# Patient Record
Sex: MALE | Race: WHITE | NOT HISPANIC OR LATINO | Employment: OTHER | URBAN - NONMETROPOLITAN AREA
[De-identification: names, ages, dates, MRNs, and addresses within clinical notes are randomized per-mention and may not be internally consistent; named-entity substitution may affect disease eponyms.]

---

## 2023-01-01 ENCOUNTER — TELEPHONE (OUTPATIENT)
Dept: CARDIOLOGY | Facility: CLINIC | Age: 88
End: 2023-01-01
Payer: MEDICARE

## 2023-09-28 ENCOUNTER — APPOINTMENT (OUTPATIENT)
Dept: GENERAL RADIOLOGY | Age: 88
End: 2023-09-28
Payer: MEDICARE

## 2023-09-28 ENCOUNTER — HOSPITAL ENCOUNTER (EMERGENCY)
Age: 88
Discharge: HOME | End: 2023-09-28
Attending: EMERGENCY MEDICINE
Payer: MEDICARE

## 2023-09-28 VITALS
BODY MASS INDEX: 34.1 KG/M2 | OXYGEN SATURATION: 97 % | HEIGHT: 68 IN | RESPIRATION RATE: 19 BRPM | TEMPERATURE: 98 F | HEART RATE: 91 BPM | WEIGHT: 225 LBS | SYSTOLIC BLOOD PRESSURE: 137 MMHG | DIASTOLIC BLOOD PRESSURE: 81 MMHG

## 2023-09-28 DIAGNOSIS — R06.00 DYSPNEA, UNSPECIFIED TYPE: Primary | ICD-10-CM

## 2023-09-28 LAB
ALBUMIN SERPL-MCNC: 3.7 G/DL (ref 3.5–4.6)
ALP SERPL-CCNC: 44 U/L (ref 35–104)
ALT SERPL-CCNC: 99 U/L (ref 0–41)
ANION GAP SERPL CALCULATED.3IONS-SCNC: 12 MEQ/L (ref 9–15)
AST SERPL-CCNC: 95 U/L (ref 0–40)
BASOPHILS # BLD: 0.1 K/UL (ref 0–0.2)
BASOPHILS NFR BLD: 0.4 %
BILIRUB SERPL-MCNC: 0.6 MG/DL (ref 0.2–0.7)
BNP BLD-MCNC: 1148 PG/ML
BUN SERPL-MCNC: 23 MG/DL (ref 8–23)
CALCIUM SERPL-MCNC: 8.3 MG/DL (ref 8.5–9.9)
CHLORIDE SERPL-SCNC: 97 MEQ/L (ref 95–107)
CO2 SERPL-SCNC: 26 MEQ/L (ref 20–31)
CREAT SERPL-MCNC: 1.01 MG/DL (ref 0.7–1.2)
EOSINOPHIL # BLD: 0 K/UL (ref 0–0.7)
EOSINOPHIL NFR BLD: 0 %
ERYTHROCYTE [DISTWIDTH] IN BLOOD BY AUTOMATED COUNT: 14.7 % (ref 11.5–14.5)
GLOBULIN SER CALC-MCNC: 1.7 G/DL (ref 2.3–3.5)
GLUCOSE SERPL-MCNC: 122 MG/DL (ref 70–99)
HCT VFR BLD AUTO: 43 % (ref 42–52)
HGB BLD-MCNC: 14.3 G/DL (ref 14–18)
INR PPP: 1.1
LACTATE BLDV-SCNC: 3.6 MMOL/L (ref 0.5–2.2)
LYMPHOCYTES # BLD: 1.2 K/UL (ref 1–4.8)
LYMPHOCYTES NFR BLD: 8.1 %
MCH RBC QN AUTO: 36.2 PG (ref 27–31.3)
MCHC RBC AUTO-ENTMCNC: 33.3 % (ref 33–37)
MCV RBC AUTO: 108.9 FL (ref 79–92.2)
MONOCYTES # BLD: 1.3 K/UL (ref 0.2–0.8)
MONOCYTES NFR BLD: 8.8 %
NEUTROPHILS # BLD: 11.1 K/UL (ref 1.4–6.5)
NEUTS SEG NFR BLD: 78.1 %
PLATELET # BLD AUTO: 171 K/UL (ref 130–400)
POTASSIUM SERPL-SCNC: 4.6 MEQ/L (ref 3.4–4.9)
PROT SERPL-MCNC: 5.4 G/DL (ref 6.3–8)
PROTHROMBIN TIME: 14.5 SEC (ref 12.3–14.9)
RBC # BLD AUTO: 3.95 M/UL (ref 4.7–6.1)
SODIUM SERPL-SCNC: 135 MEQ/L (ref 135–144)
TROPONIN T SERPL-MCNC: <0.01 NG/ML (ref 0–0.01)
WBC # BLD AUTO: 14.3 K/UL (ref 4.8–10.8)

## 2023-09-28 PROCEDURE — 84484 ASSAY OF TROPONIN QUANT: CPT

## 2023-09-28 PROCEDURE — 83880 ASSAY OF NATRIURETIC PEPTIDE: CPT

## 2023-09-28 PROCEDURE — 74022 RADEX COMPL AQT ABD SERIES: CPT

## 2023-09-28 PROCEDURE — 85610 PROTHROMBIN TIME: CPT

## 2023-09-28 PROCEDURE — 93005 ELECTROCARDIOGRAM TRACING: CPT | Performed by: EMERGENCY MEDICINE

## 2023-09-28 PROCEDURE — 99285 EMERGENCY DEPT VISIT HI MDM: CPT

## 2023-09-28 PROCEDURE — 83605 ASSAY OF LACTIC ACID: CPT

## 2023-09-28 PROCEDURE — 2580000003 HC RX 258: Performed by: EMERGENCY MEDICINE

## 2023-09-28 PROCEDURE — 85025 COMPLETE CBC W/AUTO DIFF WBC: CPT

## 2023-09-28 PROCEDURE — 36415 COLL VENOUS BLD VENIPUNCTURE: CPT

## 2023-09-28 PROCEDURE — 80053 COMPREHEN METABOLIC PANEL: CPT

## 2023-09-28 RX ORDER — 0.9 % SODIUM CHLORIDE 0.9 %
500 INTRAVENOUS SOLUTION INTRAVENOUS ONCE
Status: COMPLETED | OUTPATIENT
Start: 2023-09-28 | End: 2023-09-28

## 2023-09-28 RX ADMIN — SODIUM CHLORIDE 500 ML: 9 INJECTION, SOLUTION INTRAVENOUS at 12:17

## 2023-09-28 ASSESSMENT — PAIN - FUNCTIONAL ASSESSMENT: PAIN_FUNCTIONAL_ASSESSMENT: 0-10

## 2023-09-28 ASSESSMENT — ENCOUNTER SYMPTOMS: SHORTNESS OF BREATH: 1

## 2023-09-28 ASSESSMENT — PAIN SCALES - GENERAL: PAINLEVEL_OUTOF10: 0

## 2023-09-28 NOTE — ED PROVIDER NOTES
cardiology in 2-3 days or return to ED immediately for any new or worsening symptoms. Patient is well appearing on discharge and family agreeable with plan of care. Procedures    CRITICAL CARE TIME   Total Critical Care time was 0 minutes, excluding separately reportable procedures. There was a high probability of clinically significant/life threatening deterioration in the patient's condition which required my urgent intervention. FINAL IMPRESSION      1.  Dyspnea, unspecified type          DISPOSITION/PLAN   DISPOSITION Decision To Discharge 09/28/2023 01:05:51 PM      (Please note that portions of this note were completed with a voice recognition program.  Efforts were made to edit the dictations but occasionally words are mis-transcribed.)    Whitney Penaloza MD (electronically signed)  Attending Emergency Physician        Whitney Penaloza MD  09/28/23 [id number]

## 2023-09-29 LAB
EKG ATRIAL RATE: 101 BPM
EKG Q-T INTERVAL: 394 MS
EKG QRS DURATION: 134 MS
EKG QTC CALCULATION (BAZETT): 489 MS
EKG R AXIS: 170 DEGREES
EKG T AXIS: 61 DEGREES
EKG VENTRICULAR RATE: 93 BPM

## 2023-09-29 PROCEDURE — 93010 ELECTROCARDIOGRAM REPORT: CPT | Performed by: INTERNAL MEDICINE

## 2023-10-02 NOTE — TELEPHONE ENCOUNTER
Son, Johnny ( [phone]) called and left  a message stating patient has dyspnea and his heart is out of rhythm.  Message stating it was an urgent call. Left message on sons a/m to request an update.      Patient phoned back stating he is having a hard time breathing has been about 2-3 days that has been out of rhythm, also having dizziness with some weakness. Patient has been to [facility] twice and also has been in [facility]. 3 er visits in the last month. He states he keeps getting sent home. Has had follow up with pcp and is referring patient to pulmonary.     To Sushila Watkins NP in absence of Dr. Harrell.

## 2023-10-02 NOTE — TELEPHONE ENCOUNTER
Spoke with patient sheldon Turner, Patient is currently at [facility] and was dx with Covid and is being admitted. We are going to hold off on new orders at this time. Patient will follow up after discharge.

## 2023-11-15 ENCOUNTER — TELEPHONE (OUTPATIENT)
Dept: CARDIOLOGY | Facility: CLINIC | Age: 88
End: 2023-11-15
Payer: MEDICARE

## 2023-12-21 ENCOUNTER — APPOINTMENT (OUTPATIENT)
Dept: CARDIOLOGY | Facility: CLINIC | Age: 88
End: 2023-12-21
Payer: MEDICARE